# Patient Record
Sex: FEMALE | Race: OTHER | Employment: FULL TIME | URBAN - METROPOLITAN AREA
[De-identification: names, ages, dates, MRNs, and addresses within clinical notes are randomized per-mention and may not be internally consistent; named-entity substitution may affect disease eponyms.]

---

## 2017-07-10 ENCOUNTER — HOSPITAL ENCOUNTER (OUTPATIENT)
Dept: PHYSICAL THERAPY | Age: 37
Discharge: HOME | End: 2017-07-10
Payer: COMMERCIAL

## 2017-07-10 PROCEDURE — 97110 THERAPEUTIC EXERCISES: CPT | Performed by: PHYSICAL THERAPIST

## 2017-07-10 PROCEDURE — 97161 PT EVAL LOW COMPLEX 20 MIN: CPT | Performed by: PHYSICAL THERAPIST

## 2017-07-10 NOTE — PROGRESS NOTES
PT INITIAL EVALUATION NOTE - [facility] 2-15    Patient Name: Debora Crain  Date:7/10/2017  : 1980  [x]  Patient  Verified  Payor: Neel Pop / Plan: [employer] / Product Type: HMO /    In time:900  Out time:1000  Total Treatment Time (min): 60  Total Timed Codes (min): 60 (45 eval, 15 timed see below)  Visit #:1    Treatment Area: Left foot pain [M79.672]    SUBJECTIVE  Any medication changes, allergies to medications, adverse drug reactions, diagnosis change, or new procedure performed?: [] No    [x] Yes (see summary sheet for update)    SUBJECTIVE  Current symptoms/chief complaint: (L) foot pain  Date of onset/injury: 2 mo ago  Ran 3 miles a week ago, had pain after the run  Ran a marathon in April  Took a week off after the marathon  Noticed pain started to build up since marathon  Pain:   4-5/10 max 0/10 min 0/10 now     Aggravated by: sometimes walking around during work  Eased by: rolling foot on spikey ball  Location and description of symptoms: dorsal surface of (L) foot, sharp/shooting initially, then dulls  Any tingling/numbness in LE: none  Shoewear/orthotics: Runs in [employer]. Previous treatment: tried new insoles, chiropractic treatment   Tests/injections: X-ray 2 years ago-neg for stress fracture  Occupation: /  Prior level of function/activity level: Runs 4-5 days per week, 35 miles weekly. Lunges/squats/clams/leg lifts/donkey kicks. Patient goal: Run pain free, inc mileage to 35 miles weekly and intensity  Pt signed up fo the half in November  PMH:  Asthma, Gall bladder removal (), Prior (L) foot pain 2 years ago. Feels like the same injury as she had two years ago.     Was in a boot for 6 weeks  Ran at 12 weeks and was able to return to running    OBJECTIVE      Observation:   Increased pronation (R>L) in standing  (R) posterior rotation illium     Gait: foot flat IC elvia         ROM: AROM and PROM (L) ankle WNL all planes, slight dec PROM DF and PF at end range    MMT: 4+/5 (L) ankle all planes, p! inversion along dorsum of foot    Tenderness to palpation: (L) fibularis, extensor digitorum longus, dorsal interossei    Edema: none     SLS: 30 sec    Squat: quad dominant, dec post weight shift    Single leg squat: inc knee valgus (L) comp to (R)    Flexibility: dec gastroc    Joint Mobility: hypomobility noted (L) rearfoot into ev>inv, dec talocrural jt post glide. Dec 1st ray mobility noted       Outcome Measure: Using standardized self-reported disability survey (Focus on Therapeutic Outcomes) the patient's perceived disability score is 77 - zero is the most disabled and 100 is the least disabled. OBJECTIVE  [x] Skin assessment post-treatment:  [x]intact []redness- no adverse reaction    []redness  adverse reaction:     15 min Therapeutic Exercise:  [x] See flow sheet :   Rationale: increase ROM and increase strength to improve the patients ability to return to running          With   [x] TE   [] TA   [] neuro   [] manual: Patient Education: [x] Review HEP    [] Progressed/Changed HEP based on:   [] positioning   [] body mechanics   [] transfers   [x] Ice application- pt advised to ice 10-15 min 1-2 x/day to area in order to dec inflammation  [x] other:  re: mechanism of injury/condition, role of physical therapy, prognosis for recovery, heat vs ice, activity modifications. Education re: sitting with equal weight between elvia LE's/elvia feet at work station (avoiding with elvia feet in PF)     Pain Level (0-10 scale) post treatment: 0    ASSESSMENT/Changes in Function:     [x]  See Plan of Denise.  Nishant PT, EMILYT, CMTPT  PT License Number: [id number]   7/10/2017  8:54 AM

## 2017-07-10 NOTE — PROGRESS NOTES
[facility]  [address]  [place], [address]  Phone: [phone]  Fax: [phone]    Plan of Care/Statement of Necessity for Physical Therapy Services  2-15    Patient name: Nik Olivares  : 1980  Provider#: [id number]  Referral source: Referred, Self, MD      Medical/Treatment Diagnosis: Left foot pain [M79.672]     Prior Hospitalization: see medical history     Comorbidities: see evaluation  Prior Level of Function:see evaluation  Medications: Verified on Patient Summary List  Start of Care: 7/10/2017     Onset Date:see evaluation   The Plan of Care and following information is based on the information from the initial evaluation.     Assessment/ key information: Patient presents with signs and symptoms consistent with (L) dorsal foot pain and will benefit from physical therapy to address deficits noted below in problem list.     Evaluation Complexity History LOW Complexity : Zero comorbidities / personal factors that will impact the outcome / POC; Examination LOW Complexity : 1-2 Standardized tests and measures addressing body structure, function, activity limitation and / or participation in recreation  ;Presentation LOW Complexity : Stable, uncomplicated  ;Clinical Decision Making MEDIUM Complexity : FOTO score of 26-74  Overall Complexity Rating: LOW     Problem List: pain affecting function, decrease ROM, decrease strength and edema affecting function   Treatment Plan may include any combination of the following: Therapeutic exercise, Therapeutic activities, Neuromuscular re-education, Physical agent/modality, Manual therapy, Patient education and Self Care training  Patient / Family readiness to learn indicated by: asking questions, trying to perform skills and interest  Persons(s) to be included in education: patient (P)  Barriers to Learning/Limitations: None  Patient Goal (s): please see evaluation in [facility]  Patient Self Reported Health Status: please see paper chart  Rehabilitation Potential: excellent    Short Term Goals: To be accomplished in 5 treatments:  -Independent in HEP as evidenced on ability to perform at least 5 exercises from HEP using proper form without verbal cuing.   -Pt will report compliance with icing 1-2x/day in order to decrease inflammation  -Pain upon standing from prolonged sitting eliminated    Long Term Goals: To be accomplished in 10 treatments:  -MMT 5/5 all planes to allow patient to perform ADL's  -Pain 0/10 to allow patient to return to running training  -FOTO score greater than or equal to 74 to allow patient to perform a greater amount of ADLs. Frequency / Duration: Patient to be seen 2 times per week for 8-10 weeks. Patient/ Caregiver education and instruction: self care, activity modification and exercises    [x]  Plan of care has been reviewed with PTA    Certification Period: 7/10/2017 -  10/8/17    Jose Romero. Nishant PT, DPT, CMTPT      2/58/3192 4:08 AM  PT License Number: [id number]  _____________________________________________________________________    I certify that the above Therapy Services are being furnished while the patient is under my care. I agree with the treatment plan and certify that this therapy is necessary.     [de-identified] Signature:____________________  Date:____________Time:_________

## 2017-07-20 ENCOUNTER — HOSPITAL ENCOUNTER (OUTPATIENT)
Dept: PHYSICAL THERAPY | Age: 37
Discharge: HOME | End: 2017-07-20
Payer: COMMERCIAL

## 2017-07-20 PROCEDURE — 97140 MANUAL THERAPY 1/> REGIONS: CPT | Performed by: PHYSICAL THERAPY ASSISTANT

## 2017-07-20 PROCEDURE — 97110 THERAPEUTIC EXERCISES: CPT | Performed by: PHYSICAL THERAPY ASSISTANT

## 2017-07-20 NOTE — PROGRESS NOTES
PT DAILY TREATMENT NOTE 2-15    Patient Name: Mary Maza  Date:2017  : 1980  [x]  Patient  Verified  Payor: [employer] [facility] [employer] / Plan: [employer] / Product Type: HMO /    In time:7:30 AM Out time:8:25 AM  Total Treatment Time (min): 55  Visit #: 2     Treatment Area: Left foot pain [M79.672]    SUBJECTIVE  Pain Level (0-10 scale): 2  Any medication changes, allergies to medications, adverse drug reactions, diagnosis change, or new procedure performed?: [x] No    [] Yes (see summary sheet for update)  Subjective functional status/changes:   [] No changes reported  Patient reports compliance with HEP, states applying ice at home. She has not ran per therapist recommendations. Continues to have pain along dorsum of foot that radiates to anterior shin along extensor digitorum longus. OBJECTIVE    Modality rationale: decrease inflammation and decrease pain to improve the patients ability to occasional walking, working, running   [employer] Type Additional Details    [] Estim: []Att   []Unatt        []TENS instruct                  []IFC  []Premod   []NMES                     []Other:  []w/US   []w/ice   []w/heat  Position:  Location:    []  Traction: [] Cervical       []Lumbar                       [] Prone          []Supine                       []Intermittent   []Continuous Lbs:  [] before manual  [] after manual  []w/heat    []  Ultrasound: []Continuous   [] Pulsed at:                            []1MHz   []3MHz Location:  W/cm2:    []  Paraffin         Location:  []w/heat   Pt declined []  Ice     []  Heat  []  Ice massage Position:  Location:    []  Laser  []  Other: Position:  Location:    []  Vasopneumatic Device Pressure:       [] lo [] med [] hi   Temperature:    [x] Skin assessment post-treatment:  [x]intact []redness- no adverse reaction    []redness  adverse reaction:     40 min Therapeutic Exercise:  [x] See flow sheet : reviewed HEP, added rec.  Elliptical, toe yoga, SLS clocks, short foot with INV (windshiled wiper)   Rationale: increase ROM, increase strength, improve coordination and improve balance to improve the patients ability to occasional walking, working, running    15 min Manual Therapy:  STM to extensor digitorum longus muscle belly, posterior TC joint mobs, 1st ray mobs, 1st MTP ext/flex stretch   Rationale: decrease pain, increase ROM and increase tissue extensibility  to improve the patients ability to occasional walking, working, running     min Gait Training:  ___ feet with ___ device on level surfaces with ___ level of assist   Rationale: increase ROM, increase strength and improve coordination  to improve the patients ability to occasional walking, working, running          With   [x] TE   [] TA   [] neuro   [] other: Patient Education: [x] Review HEP    [x] Progressed/Changed HEP based on: toe yoga, short foot with INV, SLS clocks with short foot  [] positioning   [] body mechanics   [] transfers   [] heat/ice application    [] other:      Other Objective/Functional Measures: nt     Pain Level (0-10 scale) post treatment: 0/10    ASSESSMENT/Changes in Function:   Patient tolerated exercises without aggravation of symptoms. Challenged with Toe yoga exercises. Gave updated HEP. Patient will continue to benefit from skilled PT services to modify and progress therapeutic interventions, address functional mobility deficits, address ROM deficits, address strength deficits, analyze and cue movement patterns and instruct in home and community integration to attain remaining goals.      []  See Plan of Care  []  See progress note/recertification  []  See Discharge Summary         Progress towards goals / Updated goals:  nt    PLAN  []  Upgrade activities as tolerated     [x]  Continue plan of care  []  Update interventions per flow sheet       []  Discharge due to:_  [x]  Other:running analysis, change how shoe laces are tied next visit      Valery Snyder, PTA 7/20/2017  7:30 AM

## 2017-07-25 ENCOUNTER — HOSPITAL ENCOUNTER (OUTPATIENT)
Dept: PHYSICAL THERAPY | Age: 37
Discharge: HOME | End: 2017-07-25
Payer: COMMERCIAL

## 2017-07-25 PROCEDURE — 97110 THERAPEUTIC EXERCISES: CPT | Performed by: PHYSICAL THERAPY ASSISTANT

## 2017-07-25 PROCEDURE — 97140 MANUAL THERAPY 1/> REGIONS: CPT | Performed by: PHYSICAL THERAPY ASSISTANT

## 2017-07-25 NOTE — PROGRESS NOTES
PT DAILY TREATMENT NOTE 2-15    Patient Name: Natali Mis  Date:2017  : 1980  [x]  Patient  Verified  Payor: Landon Mariafredy / Plan: [employer] HMO / Product Type: HMO /    In time:7:30 AM Out time:8:30 AM  Total Treatment Time (min): 60  Visit #: 3     Treatment Area: Left foot pain [M56.428]    SUBJECTIVE  Pain Level (0-10 scale): 0/10  Any medication changes, allergies to medications, adverse drug reactions, diagnosis change, or new procedure performed?: [x] No    [] Yes (see summary sheet for update)  Subjective functional status/changes:   [] No changes reported  Patient states her foot will hurt first thing in the morning when she gets up but as she moves around the pain is less.       OBJECTIVE    Modality rationale: decrease inflammation and decrease pain to improve the patients ability to occasional walking, working, running   [employer] Type Additional Details    [] Estim: []Att   []Unatt        []TENS instruct                  []IFC  []Premod   []NMES                     []Other:  []w/US   []w/ice   []w/heat  Position:  Location:    []  Traction: [] Cervical       []Lumbar                       [] Prone          []Supine                       []Intermittent   []Continuous Lbs:  [] before manual  [] after manual  []w/heat    []  Ultrasound: []Continuous   [] Pulsed at:                            []1MHz   []3MHz Location:  W/cm2:    []  Paraffin         Location:  []w/heat   Pt declined []  Ice     []  Heat  []  Ice massage Position:  Location:    []  Laser  []  Other: Position:  Location:    []  Vasopneumatic Device Pressure:       [] lo [] med [] hi   Temperature:    [x] Skin assessment post-treatment:  [x]intact []redness- no adverse reaction    []redness  adverse reaction:     50 min Therapeutic Exercise:  [x] See flow sheet : added TG unilateral LP at 31 degrees, bosu FW/LAT step ups, lateral stepping with red band, increased reps with SLS clocks    Running Analysis performed today. Rationale: increase ROM, increase strength, improve coordination and improve balance to improve the patients ability to occasional walking, working, running    10 min Manual Therapy:  STM to extensor digitorum longus muscle belly, posterior TC joint mobs, 1st ray mobs, 1st MTP ext/flex stretch   Rationale: decrease pain, increase ROM and increase tissue extensibility  to improve the patients ability to occasional walking, working, running     min Gait Training:   Rationale: increase ROM, increase strength and improve coordination  to improve the patients ability to occasional walking, working, running          With   [x] TE   [] TA   [] neuro   [] other: Patient Education: [x] Review HEP    [x] Progressed/Changed HEP based on:   [] positioning   [] body mechanics   [] transfers   [] heat/ice application    [x] other: sent email to patient regarding alternative ways to lace her shoes to relieve pressure/tenderness along top of her foot. Other Objective/Functional Measures:     MMT:  L  R  Glut Med: 5/5  4+/5  Glut Max: 5-/5  5-/5   Hip Ext: 5/5  5-/5    Pain Level (0-10 scale) post treatment: 0/10    ASSESSMENT/Changes in Function:   Patient without reports of pain during running analysis. Noted mild R glut max and med weakness compared to L side which may be contributing to L foot pain. She tolerated new exercises without aggravation of symptoms    Patient will continue to benefit from skilled PT services to modify and progress therapeutic interventions, address functional mobility deficits, address ROM deficits, address strength deficits, analyze and cue movement patterns and instruct in home and community integration to attain remaining goals.      []  See Plan of Care  []  See progress note/recertification  []  See Discharge Summary         Progress towards goals / Updated goals:  nt    PLAN  []  Upgrade activities as tolerated     [x]  Continue plan of care  []  Update interventions per flow sheet       []  Discharge due to:_  [x]  Other:     Kalyan Braga PTA 7/25/2017  7:30 AM

## 2017-07-27 ENCOUNTER — HOSPITAL ENCOUNTER (OUTPATIENT)
Dept: PHYSICAL THERAPY | Age: 37
Discharge: HOME | End: 2017-07-27
Payer: COMMERCIAL

## 2017-07-27 PROCEDURE — 97110 THERAPEUTIC EXERCISES: CPT | Performed by: PHYSICAL THERAPY ASSISTANT

## 2017-07-27 PROCEDURE — 97116 GAIT TRAINING THERAPY: CPT | Performed by: PHYSICAL THERAPY ASSISTANT

## 2017-07-27 PROCEDURE — 97112 NEUROMUSCULAR REEDUCATION: CPT | Performed by: PHYSICAL THERAPY ASSISTANT

## 2017-07-27 NOTE — PROGRESS NOTES
PT DAILY TREATMENT NOTE 2-15    Patient Name: Bridger Carlson  Date:2017  : 1980  [x]  Patient  Verified  Payor: Carissa Coates / Plan: [employer] HMO / Product Type: HMO /    In time:7:30 AM Out time:8:35 AM  Total Treatment Time (min): 65  Visit #: 4     Treatment Area: Left foot pain [M79.532]    SUBJECTIVE  Pain Level (0-10 scale): 0/10  Any medication changes, allergies to medications, adverse drug reactions, diagnosis change, or new procedure performed?: [x] No    [] Yes (see summary sheet for update)  Subjective functional status/changes:   [] No changes reported  Patient states she may have pain 3-4 miles into her run but most of her pain is after she runs. States she has not ran since  Being advised against it at her initial evaluation. Denies any pain after 12 minute gait analysis after last visit. She watched the video in regards to how her laces should be tied.      OBJECTIVE    Modality rationale: decrease inflammation and decrease pain to improve the patients ability to occasional walking, working, running   [employer] Type Additional Details    [] Estim: []Att   []Unatt        []TENS instruct                  []IFC  []Premod   []NMES                     []Other:  []w/US   []w/ice   []w/heat  Position:  Location:    []  Traction: [] Cervical       []Lumbar                       [] Prone          []Supine                       []Intermittent   []Continuous Lbs:  [] before manual  [] after manual  []w/heat    []  Ultrasound: []Continuous   [] Pulsed at:                            []1MHz   []3MHz Location:  W/cm2:    []  Paraffin         Location:  []w/heat   Pt declined []  Ice     []  Heat  []  Ice massage Position:  Location:    []  Laser  []  Other: Position:  Location:    []  Vasopneumatic Device Pressure:       [] lo [] med [] hi   Temperature:    [x] Skin assessment post-treatment:  [x]intact []redness- no adverse reaction    []redness  adverse reaction:     40 min Therapeutic Exercise:  [x] See flow sheet : added   Added bridge with marchuche   Rationale: increase ROM, increase strength, improve coordination and improve balance to improve the patients ability to occasional walking, working, running    omit min Manual Therapy:  STM to extensor digitorum longus muscle belly, posterior TC joint mobs, 1st ray mobs, 1st MTP ext/flex stretch   Rationale: decrease pain, increase ROM and increase tissue extensibility  to improve the patients ability to occasional walking, working, running    15 min Gait Training:  Reviewed running analysis: encouraged patient to try to shorten her stride to reduce knee extension at IC. Also discussed trendelenburg sign in R Stance as a contributing factor in L foot pain. Rationale: increase ROM, increase strength and improve coordination  to improve the patients ability to occasional walking, working, running    10 min Neuromuscular Re-Education:  Added \"Running Man\" and Hop and Hold focusing on neutral pelvic alignment in stance phase    Rationale: increase ROM, increase strength and improve coordination  to improve the patients ability to occasional walking, working, running          With   [x] TE   [] TA   [] neuro   [] other: Patient Education: [x] Review HEP    [x] Progressed/Changed HEP based on: bridge with march, lateral stepping with red band, running man, hop and hold  [] positioning   [] body mechanics   [] transfers   [] heat/ice application    [x] other:      Other Objective/Functional Measures:     Pain Level (0-10 scale) post treatment: 0/10    ASSESSMENT/Changes in Function:    Patient challenged with addition of glut strengthening and neutral pelvic alignment in WB but no reports of foot pain, gave updated HEP with patient to trial with run/walk.    Patient will continue to benefit from skilled PT services to modify and progress therapeutic interventions, address functional mobility deficits, address ROM deficits, address strength deficits, analyze and cue movement patterns and instruct in home and community integration to attain remaining goals.      []  See Plan of Care  []  See progress note/recertification  []  See Discharge Summary         Progress towards goals / Updated goals:  nt    PLAN  []  Upgrade activities as tolerated     [x]  Continue plan of care  []  Update interventions per flow sheet       []  Discharge due to:_  [x]  Other: gait analysis next visit, send note to MD David Jackson, PTA 7/27/2017  7:30 AM

## 2017-07-31 ENCOUNTER — HOSPITAL ENCOUNTER (OUTPATIENT)
Dept: PHYSICAL THERAPY | Age: 37
Discharge: HOME | End: 2017-07-31
Payer: COMMERCIAL

## 2017-07-31 PROCEDURE — 97110 THERAPEUTIC EXERCISES: CPT | Performed by: PHYSICAL THERAPY ASSISTANT

## 2017-07-31 PROCEDURE — 97112 NEUROMUSCULAR REEDUCATION: CPT | Performed by: PHYSICAL THERAPY ASSISTANT

## 2017-07-31 NOTE — PROGRESS NOTES
PT DAILY TREATMENT NOTE 2-15    Patient Name: Lisa Draft  Date:2017  : 1980  [x]  Patient  Verified  Payor: Michelle Jhaveri / Plan: [employer] / Product Type: HMO /    In time:4:00 PM Out time:5:00 PM  Total Treatment Time (min): 60  Visit #: 5     Treatment Area: Left foot pain [M79.032]    SUBJECTIVE  Pain Level (0-10 scale): 0/10  Any medication changes, allergies to medications, adverse drug reactions, diagnosis change, or new procedure performed?: [x] No    [] Yes (see summary sheet for update)  Subjective functional status/changes:   [] No changes reported  Patient states she did a run walk on Thursday for 21 minutes and 30 minutes on Saturday, she has been practicing running at 172bpm to shorten her stride. Denies any pain after her run/walk sessions over the weekend. States she hasn't woken up with foot pain since last visit.     OBJECTIVE    Modality rationale: decrease inflammation and decrease pain to improve the patients ability to occasional walking, working, running   [employer] Type Additional Details    [] Estim: []Att   []Unatt        []TENS instruct                  []IFC  []Premod   []NMES                     []Other:  []w/US   []w/ice   []w/heat  Position:  Location:    []  Traction: [] Cervical       []Lumbar                       [] Prone          []Supine                       []Intermittent   []Continuous Lbs:  [] before manual  [] after manual  []w/heat    []  Ultrasound: []Continuous   [] Pulsed at:                            []1MHz   []3MHz Location:  W/cm2:    []  Paraffin         Location:  []w/heat   Pt declined []  Ice     []  Heat  []  Ice massage Position:  Location:    []  Laser  []  Other: Position:  Location:    []  Vasopneumatic Device Pressure:       [] lo [] med [] hi   Temperature:    [x] Skin assessment post-treatment:  [x]intact []redness- no adverse reaction    []redness  adverse reaction:     45 min Therapeutic Exercise:  [x] See flow sheet : added steamboats, monster walks     Rationale: increase ROM, increase strength, improve coordination and improve balance to improve the patients ability to occasional walking, working, running    omit min Manual Therapy:  STM to extensor digitorum longus muscle belly, posterior TC joint mobs, 1st ray mobs, 1st MTP ext/flex stretch   Rationale: decrease pain, increase ROM and increase tissue extensibility  to improve the patients ability to occasional walking, working, running    omit min Gait Training:  Reviewed running analysis: encouraged patient to try to shorten her stride to reduce knee extension at IC. Also discussed trendelenburg sign in R Stance as a contributing factor in L foot pain. Rationale: increase ROM, increase strength and improve coordination  to improve the patients ability to occasional walking, working, running    15 min Neuromuscular Re-Education:  Added \"Running Man\" and Hop and Hold focusing on neutral pelvic alignment in stance phase, steamboats with red band in SLS    Rationale: increase ROM, increase strength and improve coordination  to improve the patients ability to occasional walking, working, running          With   [x] TE   [] TA   [] neuro   [] other: Patient Education: [x] Review HEP    [x] Progressed/Changed HEP based on: bridge with march, lateral stepping with red band, running man, hop and hold  [] positioning   [] body mechanics   [] transfers   [] heat/ice application    [x] other:      Other Objective/Functional Measures:     Pain Level (0-10 scale) post treatment: 0/10    ASSESSMENT/Changes in Function:     Challenged with SLS steamboats, progressing well with therapeutic interventions.      Patient will continue to benefit from skilled PT services to modify and progress therapeutic interventions, address functional mobility deficits, address ROM deficits, address strength deficits, analyze and cue movement patterns and instruct in home and community integration to attain remaining goals.      []  See Plan of Care  []  See progress note/recertification  []  See Discharge Summary         Progress towards goals / Updated goals:  nt    PLAN  []  Upgrade activities as tolerated     [x]  Continue plan of care  []  Update interventions per flow sheet       []  Discharge due to:_  [x]  Other: review gait analysis     Kalyan Braga, PTA 7/31/2017  4:00 PM

## 2017-08-03 ENCOUNTER — HOSPITAL ENCOUNTER (OUTPATIENT)
Dept: PHYSICAL THERAPY | Age: 37
Discharge: HOME | End: 2017-08-03
Payer: COMMERCIAL

## 2017-08-03 PROCEDURE — 97110 THERAPEUTIC EXERCISES: CPT | Performed by: PHYSICAL THERAPIST

## 2017-08-03 PROCEDURE — 97112 NEUROMUSCULAR REEDUCATION: CPT | Performed by: PHYSICAL THERAPIST

## 2017-08-03 NOTE — PROGRESS NOTES
PT DAILY TREATMENT NOTE 2-15    Patient Name: Nori Farrar  Date:8/3/2017  : 1980  [x]  Patient  Verified  Payor: Nasrin Hong / Plan: [employer] HMO / Product Type: HMO /    In time:500 P Out time:550  Total Treatment Time (min):50  Visit #: 6    Treatment Area: Left foot pain [M79.382]    SUBJECTIVE  Pain Level (0-10 scale): 0/10  Any medication changes, allergies to medications, adverse drug reactions, diagnosis change, or new procedure performed?: [x] No    [] Yes (see summary sheet for update)  Subjective functional status/changes:   [] No changes reported  \"no pain at all with running, it's been feeling really good. Exercises are helping.  \"    OBJECTIVE    [x] Skin assessment post-treatment:  [x]intact []redness- no adverse reaction    []redness  adverse reaction:     40 min Therapeutic Exercise:  [x] See flow sheet :      Rationale: increase ROM, increase strength, improve coordination and improve balance to improve the patients ability to occasional walking, working, running    omit min Manual Therapy:  STM to extensor digitorum longus muscle belly, posterior TC joint mobs, 1st ray mobs, 1st MTP ext/flex stretch   Rationale: decrease pain, increase ROM and increase tissue extensibility  to improve the patients ability to occasional walking, working, running    omit min Gait Training:     Rationale: increase ROM, increase strength and improve coordination  to improve the patients ability to occasional walking, working, running    10 min Neuromuscular Re-Education:   Rationale: increase ROM, increase strength and improve coordination  to improve the patients ability to occasional walking, working, running          With   [x] TE   [] TA   [] neuro   [] other: Patient Education: [x] Review HEP    [x] Progressed/Changed HEP based on: bridge with march, lateral stepping with red band, running man, hop and hold  [] positioning   [] body mechanics   [] transfers   [] heat/ice application    [x] other: discussed transition to HEP. If questions, pt advised to email/call. Pt advised if she should need to return for further PT, pt would need to obtain a RX from PCP     Other Objective/Functional Measures:     Using standardized self-reported disability survey (Focus on Therapeutic Outcomes) the patient's perceived disability score is 83 - zero is the most disabled and 100 is the least disabled. This is a 17 point improvement since initial assessment performed on 7/10/17. Pain Level (0-10 scale) post treatment: 0/10    ASSESSMENT/Changes in Function:        Progress towards goals / Updated goals:     Short Term Goals: To be accomplished in 5 treatments:  -Independent in HEP as evidenced on ability to perform at least 5 exercises from HEP using proper form without verbal cuing. -MET  -Pt will report compliance with icing 1-2x/day in order to decrease inflammation-MET  -Pain upon standing from prolonged sitting eliminated-MET     Long Term Goals: To be accomplished in 10 treatments:  -MMT 5/5 all planes to allow patient to perform ADL's-MET  -Pain 0/10 to allow patient to return to running training-MET  -FOTO score greater than or equal to 74 to allow patient to perform a greater amount of ADLs. -MET  Pt current has met all goals outlined in the initial evaluation. Pt ready to attempt transition to HEP at this time. PLAN  []  Upgrade activities as tolerated     [x]  Continue plan of care  []  Update interventions per flow sheet       []  Discharge due to:_  []  Other: Pt to transition to HEP. Plan to leave chart open until 9/3/17 in case pt's symptoms return. Mecca Dillard, PT 8/3/2017  4:00 PM

## 2017-11-16 ENCOUNTER — OFFICE VISIT (OUTPATIENT)
Dept: INTERNAL MEDICINE CLINIC | Age: 37
End: 2017-11-16

## 2017-11-16 VITALS
OXYGEN SATURATION: 100 % | DIASTOLIC BLOOD PRESSURE: 72 MMHG | HEART RATE: 64 BPM | SYSTOLIC BLOOD PRESSURE: 126 MMHG | TEMPERATURE: 98.6 F

## 2017-11-16 DIAGNOSIS — M77.8 HAND TENDONITIS: ICD-10-CM

## 2017-11-16 DIAGNOSIS — M71.332 SYNOVIAL CYST OF WRIST, LEFT: ICD-10-CM

## 2017-11-16 DIAGNOSIS — Z00.00 WELL WOMAN EXAM (NO GYNECOLOGICAL EXAM): Primary | ICD-10-CM

## 2017-11-16 DIAGNOSIS — J30.9 ALLERGIC RHINITIS, UNSPECIFIED CHRONICITY, UNSPECIFIED SEASONALITY, UNSPECIFIED TRIGGER: ICD-10-CM

## 2017-11-16 RX ORDER — BISMUTH SUBSALICYLATE 262 MG
1 TABLET,CHEWABLE ORAL DAILY
COMMUNITY

## 2017-11-16 RX ORDER — AZELASTINE 1 MG/ML
SPRAY, METERED NASAL
Refills: 3 | COMMUNITY
Start: 2017-11-01 | End: 2020-10-13

## 2017-11-16 NOTE — MR AVS SNAPSHOT
Visit Information Date & Time Provider Department Dept. Phone Encounter #  
 11/16/2017 10:00 AM Trip Bass, [address] [place] [address], [facility] [phone] Follow-up Instructions Return in about 1 year (around 11/16/2018), or if symptoms worsen or fail to improve, for wellness visit. Upcoming Health Maintenance Date Due  
 PAP AKA CERVICAL CYTOLOGY 6/1/2018 DTaP/Tdap/Td series (2 - Td) 8/12/2024 Allergies as of 11/16/2017  Review Complete On: 11/16/2017 By: Trip Bass MD  
 No Known Allergies Current Immunizations  Reviewed on 11/16/2017 Name Date Influenza Vaccine 9/20/2017, 9/18/2015 TD Vaccine 9/21/2009 Tdap 8/12/2014 Reviewed by Trip Bass MD on 11/16/2017 at 11:30 AM  
You Were Diagnosed With   
  
 Codes Comments Well woman exam (no gynecological exam)    -  Primary ICD-10-CM: Z00.00 ICD-9-CM: V70.0 [V70.0] Hand tendonitis     ICD-10-CM: M77.8 ICD-9-CM: 727.05 Allergic rhinitis, unspecified chronicity, unspecified seasonality, unspecified trigger     ICD-10-CM: J30.9 ICD-9-CM: 477.9 Synovial cyst of wrist, left     ICD-10-CM: U65.902 ICD-9-CM: 727.41 Vitals BP Pulse Temp SpO2 OB Status Smoking Status 126/72 (BP 1 Location: Left arm, BP Patient Position: Sitting) 64 98.6 °F (37 °C) (Oral) 100% Having regular periods Never Smoker Vitals History Preferred Pharmacy Pharmacy Name Phone [facility] [address] [phone] Your Updated Medication List  
  
   
This list is accurate as of: 11/16/17 11:38 AM.  Always use your most recent med list.  
  
  
  
  
 azelastine 137 mcg (0.1 %) nasal spray Commonly known as:  ASTELIN  
SPRAY 1-2 SQUIRTS INTO EACH NOSTRIL EVERY 12 HOURS  
  
 cyclobenzaprine 10 mg tablet Commonly known as:  FLEXERIL Take 0.5 Tabs by mouth three (3) times daily as needed for Muscle Spasm(s). DYMISTA 137-50 mcg/spray [place] Generic drug:  azelastine-fluticasone  
by Nasal route. methylPREDNISolone 4 mg tablet Commonly known as:  MEDROL (BOBBY) Per dose pack instructions  
  
 multivitamin tablet Commonly known as:  ONE A DAY Take 1 Tab by mouth daily. PATADAY OP Apply  to eye. PROVENTIL HFA 90 mcg/actuation inhaler Generic drug:  albuterol Take 1 Puff by inhalation. ZyrTEC 10 mg tablet Generic drug:  cetirizine Take  by mouth as needed. Mostly in the Spring We Performed the Following MANAV W/REFLEX [86824 CPT(R)] C REACTIVE PROTEIN, QT [34683 CPT(R)] CBC WITH AUTOMATED DIFF [05202 CPT(R)] LIPID PANEL [92765 CPT(R)] METABOLIC PANEL, COMPREHENSIVE [70624 CPT(R)]   
 RA + CCP ABS [BTC05871 Custom] REFERRAL TO PHYSICAL THERAPY [UPA27 Custom] Comments:  
 Hand tendonitis SED RATE (ESR) [id number] CPT(R)] VITAMIN D, 25 HYDROXY [id number] CPT(R)] Follow-up Instructions Return in about 1 year (around 11/16/2018), or if symptoms worsen or fail to improve, for wellness visit. Referral Information Referral ID Referred By Referred To  
  
 [id number] Kalli Orlando Not Available Visits Status Start Date End Date 1 New Request 11/16/17 11/16/18 If your referral has a status of pending review or denied, additional information will be sent to support the outcome of this decision. Introducing [facility]! Dear Chloe Madison: Thank you for requesting a [employer] account. Our records indicate that you already have an active [employer] account. You can access your account anytime at [email or web address] Did you know that you can access your hospital and ER discharge instructions at any time in [employer]? You can also review all of your test results from your hospital stay or ER visit. Additional Information If you have questions, please visit the Frequently Asked Questions section of the [employer] website at [email or web address] Remember, [employer] is NOT to be used for urgent needs. For medical emergencies, dial 911. Now available from your iPhone and Android! Please provide this summary of care documentation to your next provider. Your primary care clinician is listed as [address] If you have any questions after today's visit, please call [phone].

## 2017-11-16 NOTE — PROGRESS NOTES
Rm 14    Chief Complaint   Patient presents with    Complete Physical    Wrist Pain     bilateral, very achy per pt, hard to  things     1. Have you been to the ER, urgent care clinic since your last visit? Hospitalized since your last visit? No    2. Have you seen or consulted any other health care providers outside of the [address] [place] [address] since your last visit? Include any pap smears or colon screening.  No    Health Maintenance Due   Topic Date Due    Pneumococcal 19-64 Medium Risk (1 of 1 - PPSV23) 06/07/2001    PAP AKA CERVICAL CYTOLOGY  10/01/2014   flu vaccine done with employer 09/20/17  Pap smear done 6/2017 Dr Duff Party    PHQ over the last two weeks 11/16/2017   Little interest or pleasure in doing things Not at all   Feeling down, depressed or hopeless Not at all   Total Score PHQ 2 0

## 2017-11-16 NOTE — PROGRESS NOTES
Subjective:   30 y.o. female for Well Woman Check. Her gyne and breast care is done elsewhere by her Ob-Gyne physician. Past medical, Social, and Family history reviewed  Medications reviewed and updated. ROS: Feeling generally well. No TIA's or unusual headaches, no dysphagia. No prolonged cough. No dyspnea or chest pain on exertion. No abdominal pain, change in bowel habits, black or bloody stools. No urinary tract symptoms. No new or unusual musculoskeletal symptoms. Specific concerns today:   L>R wrist and hand pain    Seeing endocrine - 10/2017 prolactin and thyroid NL. Seasonal allergies and asthma - exercise induced      Objective: The patient appears well, alert, oriented x 3, in no distress. Visit Vitals    /72 (BP 1 Location: Left arm, BP Patient Position: Sitting)    Pulse 64    Temp 98.6 °F (37 °C) (Oral)    SpO2 100%     ENT normal.  Neck supple. No adenopathy or thyromegaly. KAMINI. Lungs are clear, good air entry, no wheezes, rhonchi or rales. S1 and S2 normal, no murmurs, regular rate and rhythm. Abdomen soft without tenderness, guarding, mass or organomegaly. Extremities show no edema, normal peripheral pulses. Neurological is normal, no focal findings. Breast and Pelvic exams are deferred. Prior labs reviewed. Assessment/Plan:   Well Woman  follow low fat diet, routine labs ordered, call if any problems    ICD-10-CM ICD-9-CM    1. Well woman exam (no gynecological exam) Z00.00 V70.0 CBC WITH AUTOMATED DIFF      LIPID PANEL      METABOLIC PANEL, COMPREHENSIVE      VITAMIN D, 25 HYDROXY    [V70.0]   2. Hand tendonitis M77.8 727.05 REFERRAL TO PHYSICAL THERAPY      SED RATE (ESR)      C REACTIVE PROTEIN, QT      MANAV W/REFLEX      RA + CCP ABS   3. Allergic rhinitis, unspecified chronicity, unspecified seasonality, unspecified trigger J30.9 477.9    4.  Synovial cyst of wrist, left M67.432 727.41      Follow-up Disposition:  Return in about 1 year (around 11/16/2018), or if symptoms worsen or fail to improve, for wellness visit.   results and schedule of future studies reviewed with patient  reviewed diet, exercise and weight   cardiovascular risk and specific lipid/LDL goals reviewed  reviewed medications and side effects in detail   Ref to Hand PT  Consider ortho hand referral - deferred today  Follow with endocrine

## 2017-11-18 LAB
25(OH)D3+25(OH)D2 SERPL-MCNC: 44.7 NG/ML (ref 30–100)
ALBUMIN SERPL-MCNC: 4.6 G/DL (ref 3.5–5.5)
ALBUMIN/GLOB SERPL: 1.9 {RATIO} (ref 1.2–2.2)
ALP SERPL-CCNC: 53 IU/L (ref 39–117)
ALT SERPL-CCNC: 11 IU/L (ref 0–32)
ANA SER QL: NEGATIVE
AST SERPL-CCNC: 18 IU/L (ref 0–40)
BASOPHILS # BLD AUTO: 0 X10E3/UL (ref 0–0.2)
BASOPHILS NFR BLD AUTO: 0 %
BILIRUB SERPL-MCNC: 0.7 MG/DL (ref 0–1.2)
BUN SERPL-MCNC: 12 MG/DL (ref 6–20)
BUN/CREAT SERPL: 19 (ref 9–23)
CALCIUM SERPL-MCNC: 9.5 MG/DL (ref 8.7–10.2)
CCP IGA+IGG SERPL IA-ACNC: 5 UNITS (ref 0–19)
CHLORIDE SERPL-SCNC: 99 MMOL/L (ref 96–106)
CHOLEST SERPL-MCNC: 147 MG/DL (ref 100–199)
CO2 SERPL-SCNC: 26 MMOL/L (ref 18–29)
CREAT SERPL-MCNC: 0.63 MG/DL (ref 0.57–1)
CRP SERPL-MCNC: 1.3 MG/L (ref 0–4.9)
EOSINOPHIL # BLD AUTO: 0 X10E3/UL (ref 0–0.4)
EOSINOPHIL NFR BLD AUTO: 0 %
ERYTHROCYTE [DISTWIDTH] IN BLOOD BY AUTOMATED COUNT: 13.5 % (ref 12.3–15.4)
ERYTHROCYTE [SEDIMENTATION RATE] IN BLOOD BY WESTERGREN METHOD: 6 MM/HR (ref 0–32)
GLOBULIN SER CALC-MCNC: 2.4 G/DL (ref 1.5–4.5)
GLUCOSE SERPL-MCNC: 85 MG/DL (ref 65–99)
HCT VFR BLD AUTO: 37 % (ref 34–46.6)
HDLC SERPL-MCNC: 48 MG/DL
HGB BLD-MCNC: 12.5 G/DL (ref 11.1–15.9)
IMM GRANULOCYTES # BLD: 0 X10E3/UL (ref 0–0.1)
IMM GRANULOCYTES NFR BLD: 0 %
LDLC SERPL CALC-MCNC: 89 MG/DL (ref 0–99)
LYMPHOCYTES # BLD AUTO: 1.6 X10E3/UL (ref 0.7–3.1)
LYMPHOCYTES NFR BLD AUTO: 20 %
MCH RBC QN AUTO: 29.3 PG (ref 26.6–33)
MCHC RBC AUTO-ENTMCNC: 33.8 G/DL (ref 31.5–35.7)
MCV RBC AUTO: 87 FL (ref 79–97)
MONOCYTES # BLD AUTO: 0.5 X10E3/UL (ref 0.1–0.9)
MONOCYTES NFR BLD AUTO: 6 %
NEUTROPHILS # BLD AUTO: 5.7 X10E3/UL (ref 1.4–7)
NEUTROPHILS NFR BLD AUTO: 74 %
PLATELET # BLD AUTO: 262 X10E3/UL (ref 150–379)
POTASSIUM SERPL-SCNC: 4.1 MMOL/L (ref 3.5–5.2)
PROT SERPL-MCNC: 7 G/DL (ref 6–8.5)
RBC # BLD AUTO: 4.26 X10E6/UL (ref 3.77–5.28)
RHEUMATOID FACT SERPL-ACNC: <10 IU/ML (ref 0–13.9)
SODIUM SERPL-SCNC: 138 MMOL/L (ref 134–144)
TRIGL SERPL-MCNC: 49 MG/DL (ref 0–149)
VLDLC SERPL CALC-MCNC: 10 MG/DL (ref 5–40)
WBC # BLD AUTO: 7.9 X10E3/UL (ref 3.4–10.8)

## 2018-12-04 ENCOUNTER — OFFICE VISIT (OUTPATIENT)
Dept: INTERNAL MEDICINE CLINIC | Age: 38
End: 2018-12-04

## 2018-12-04 VITALS
TEMPERATURE: 98.6 F | WEIGHT: 160 LBS | RESPIRATION RATE: 18 BRPM | BODY MASS INDEX: 30.21 KG/M2 | DIASTOLIC BLOOD PRESSURE: 74 MMHG | SYSTOLIC BLOOD PRESSURE: 122 MMHG | HEIGHT: 61 IN | HEART RATE: 70 BPM | OXYGEN SATURATION: 100 %

## 2018-12-04 DIAGNOSIS — R10.11 COLICKY RUQ ABDOMINAL PAIN: Primary | ICD-10-CM

## 2018-12-04 DIAGNOSIS — Z90.49 HISTORY OF CHOLECYSTECTOMY: ICD-10-CM

## 2018-12-04 RX ORDER — NORETHINDRONE ACETATE AND ETHINYL ESTRADIOL AND FERROUS FUMARATE 1MG-20(21)
KIT ORAL
Refills: 1 | COMMUNITY
Start: 2018-11-19 | End: 2019-07-31

## 2018-12-04 RX ORDER — RANITIDINE HCL 75 MG
75 TABLET ORAL
COMMUNITY
End: 2019-07-31

## 2018-12-04 NOTE — PATIENT INSTRUCTIONS
For pain. Try taking 400 mg advil 2-3 times per day. We will plan to contact you with lab results once available tomorrow or Thursday. Abdominal Pain: Care Instructions  Your Care Instructions    Abdominal pain has many possible causes. Some aren't serious and get better on their own in a few days. Others need more testing and treatment. If your pain continues or gets worse, you need to be rechecked and may need more tests to find out what is wrong. You may need surgery to correct the problem. Don't ignore new symptoms, such as fever, nausea and vomiting, urination problems, pain that gets worse, and dizziness. These may be signs of a more serious problem. Your doctor may have recommended a follow-up visit in the next 8 to 12 hours. If you are not getting better, you may need more tests or treatment. The doctor has checked you carefully, but problems can develop later. If you notice any problems or new symptoms, get medical treatment right away. Follow-up care is a key part of your treatment and safety. Be sure to make and go to all appointments, and call your doctor if you are having problems. It's also a good idea to know your test results and keep a list of the medicines you take. How can you care for yourself at home? · Rest until you feel better. · To prevent dehydration, drink plenty of fluids, enough so that your urine is light yellow or clear like water. Choose water and other caffeine-free clear liquids until you feel better. If you have kidney, heart, or liver disease and have to limit fluids, talk with your doctor before you increase the amount of fluids you drink. · If your stomach is upset, eat mild foods, such as rice, dry toast or crackers, bananas, and applesauce. Try eating several small meals instead of two or three large ones. · Wait until 48 hours after all symptoms have gone away before you have spicy foods, alcohol, and drinks that contain caffeine.   · Do not eat foods that are high in fat. · Avoid anti-inflammatory medicines such as aspirin, ibuprofen (Advil, Motrin), and naproxen (Aleve). These can cause stomach upset. Talk to your doctor if you take daily aspirin for another health problem. When should you call for help? Call 911 anytime you think you may need emergency care. For example, call if:    · You passed out (lost consciousness).     · You pass maroon or very bloody stools.     · You vomit blood or what looks like coffee grounds.     · You have new, severe belly pain.    Call your doctor now or seek immediate medical care if:    · Your pain gets worse, especially if it becomes focused in one area of your belly.     · You have a new or higher fever.     · Your stools are black and look like tar, or they have streaks of blood.     · You have unexpected vaginal bleeding.     · You have symptoms of a urinary tract infection. These may include:  ? Pain when you urinate. ? Urinating more often than usual.  ? Blood in your urine.     · You are dizzy or lightheaded, or you feel like you may faint.    Watch closely for changes in your health, and be sure to contact your doctor if:    · You are not getting better after 1 day (24 hours). Where can you learn more? Go to http://masoud-luma.info/. Enter L623 in the search box to learn more about \"Abdominal Pain: Care Instructions. \"  Current as of: November 20, 2017  Content Version: 11.8  © 5846-6500 [employer]. Care instructions adapted under license by [employer] (which disclaims liability or warranty for this information). If you have questions about a medical condition or this instruction, always ask your healthcare professional. [place] [address] any warranty or liability for your use of this information.

## 2018-12-04 NOTE — PROGRESS NOTES
Exam room 2    Toshia Colón is a 41 y.o. female    Chief Complaint   Patient presents with    Abdominal Pain     upper right quandrant x 4 days     1. Have you been to the ER, urgent care clinic since your last visit? Hospitalized since your last visit? No    2. Have you seen or consulted any other health care providers outside of the [address] [place] [address] since your last visit? Include any pap smears or colon screening. No     Health Maintenance Due   Topic Date Due    PAP AKA CERVICAL CYTOLOGY  06/01/2018    Influenza Age 5 to Adult  08/01/2018     Influenza vaccine received 9/2018 through employer.

## 2018-12-04 NOTE — PROGRESS NOTES
ACUTE VISIT     HPI:   Jillian Ceballos is a 41 y.o. female, she presents today for:     41year old woman with history of cholecystectomy, presenting with RUQ pain. - started with  More severe on Sunday, always present but has some surges in pain. Seems to worsen with certain movements. No changes in bowel movements no urination. - no constipation, no extra flatulence. - no measured fevers. - no nausea, but lack of appetite with pain was high. Has had some intermittently since gallbladder removed. 12 years ago. Has never been evaluated for this pain previously. Notes that she tried taking pain medication and it did not help. (tylenol 3)    ROS: as noted above    Medications used for acute illness: tylenol 3    Current Outpatient Medications on File Prior to Visit   Medication Sig    JUNEL FE 1/20, [birth year], 1 mg-20 mcg (21)/75 mg (7) tab TAKE 1 TABLET BY MOUTH EVERY DAY    raNITIdine (ZANTAC) 75 mg tab Take 75 mg by mouth.  multivitamin (ONE A DAY) tablet Take 1 Tab by mouth daily.  OLOPATADINE HCL (PATADAY OP) Apply  to eye.  cetirizine (ZYRTEC) 10 mg tablet Take  by mouth as needed. Mostly in the Spring     albuterol (PROVENTIL HFA) 90 mcg/Actuation inhaler Take 1 Puff by inhalation.  azelastine (ASTELIN) 137 mcg (0.1 %) nasal spray SPRAY 1-2 SQUIRTS INTO EACH NOSTRIL EVERY 12 HOURS    cyclobenzaprine (FLEXERIL) 10 mg tablet Take 0.5 Tabs by mouth three (3) times daily as needed for Muscle Spasm(s).  azelastine-fluticasone (DYMISTA) 137-50 mcg/spray spry by Nasal route. No current facility-administered medications on file prior to visit. No Known Allergies    PMH/PSH/FH: reviewed and updated    Sochx:   reports that  has never smoked. she has never used smokeless tobacco. She reports that she does not drink alcohol or use drugs. PE:  Blood pressure 122/74, pulse 70, temperature 98.6 °F (37 °C), temperature source Oral, resp.  rate 18, height 5' 1\" (1.549 m), weight 160 lb (72.6 kg), last menstrual period 11/26/2018, SpO2 100 %. Body mass index is 30.23 kg/m². Physical Exam   Constitutional: She is oriented to person, place, and time. She appears well-developed and well-nourished. No distress. HENT:   Head: Normocephalic. Mouth/Throat: Oropharynx is clear and moist.   Eyes: Conjunctivae and EOM are normal.   Neck: Neck supple. Cardiovascular: Normal rate, regular rhythm and normal heart sounds. Pulmonary/Chest: Effort normal and breath sounds normal.   Abdominal: Soft. She exhibits no mass. There is no rebound. Tender in lateral aspect of right upper abdomen. Lymphadenopathy:     She has no cervical adenopathy. Neurological: She is alert and oriented to person, place, and time. Skin: Skin is warm and dry. Capillary refill takes less than 2 seconds. Nursing note and vitals reviewed. Labs:  No results found for any visits on 12/04/18. Panda Ernandez was seen today for had concerns including Abdominal Pain (upper right quandrant x 4 days). .  The diagnosis and plan was discussed including:        ICD-10-CM ICD-9-CM    1. Colicky RUQ abdominal pain R10.11 789.01 US ABD LTD      CBC WITH AUTOMATED DIFF      METABOLIC PANEL, COMPREHENSIVE      LIPASE      REFERRAL TO GASTROENTEROLOGY   2. History of cholecystectomy Z90.49 V45.79 US ABD LTD      CBC WITH AUTOMATED DIFF      METABOLIC PANEL, COMPREHENSIVE      LIPASE      REFERRAL TO GASTROENTEROLOGY     Colicky RUQ pain: intermittent for 12 years since gallbladder removed, but 4 days ago more extreme and only somewhat relieved. With normal appetite, no fever, no jaundice, normal bowel movements. Suspect possible liver vs biliary tree irritation.    - labs, ultrasound and referred to GI.     - I advised her to call back or return to office if symptoms worsen/change/persist.  - She was given AVS and expressed understanding with the diagnosis and plan as discussed.     Follow-up Disposition:  Return if symptoms worsen or fail to improve.

## 2018-12-05 LAB
ALBUMIN SERPL-MCNC: 4.3 G/DL (ref 3.5–5.5)
ALBUMIN/GLOB SERPL: 1.5 {RATIO} (ref 1.2–2.2)
ALP SERPL-CCNC: 55 IU/L (ref 39–117)
ALT SERPL-CCNC: 12 IU/L (ref 0–32)
AST SERPL-CCNC: 17 IU/L (ref 0–40)
BASOPHILS # BLD AUTO: 0 X10E3/UL (ref 0–0.2)
BASOPHILS NFR BLD AUTO: 0 %
BILIRUB SERPL-MCNC: <0.2 MG/DL (ref 0–1.2)
BUN SERPL-MCNC: 13 MG/DL (ref 6–20)
BUN/CREAT SERPL: 17 (ref 9–23)
CALCIUM SERPL-MCNC: 9.5 MG/DL (ref 8.7–10.2)
CHLORIDE SERPL-SCNC: 99 MMOL/L (ref 96–106)
CO2 SERPL-SCNC: 25 MMOL/L (ref 20–29)
CREAT SERPL-MCNC: 0.78 MG/DL (ref 0.57–1)
EOSINOPHIL # BLD AUTO: 0 X10E3/UL (ref 0–0.4)
EOSINOPHIL NFR BLD AUTO: 0 %
ERYTHROCYTE [DISTWIDTH] IN BLOOD BY AUTOMATED COUNT: 14.5 % (ref 12.3–15.4)
GLOBULIN SER CALC-MCNC: 2.8 G/DL (ref 1.5–4.5)
GLUCOSE SERPL-MCNC: 94 MG/DL (ref 65–99)
HCT VFR BLD AUTO: 38.2 % (ref 34–46.6)
HGB BLD-MCNC: 12.5 G/DL (ref 11.1–15.9)
IMM GRANULOCYTES # BLD: 0 X10E3/UL (ref 0–0.1)
IMM GRANULOCYTES NFR BLD: 0 %
LIPASE SERPL-CCNC: 39 U/L (ref 14–72)
LYMPHOCYTES # BLD AUTO: 2.2 X10E3/UL (ref 0.7–3.1)
LYMPHOCYTES NFR BLD AUTO: 21 %
MCH RBC QN AUTO: 27.8 PG (ref 26.6–33)
MCHC RBC AUTO-ENTMCNC: 32.7 G/DL (ref 31.5–35.7)
MCV RBC AUTO: 85 FL (ref 79–97)
MONOCYTES # BLD AUTO: 0.6 X10E3/UL (ref 0.1–0.9)
MONOCYTES NFR BLD AUTO: 5 %
NEUTROPHILS # BLD AUTO: 7.6 X10E3/UL (ref 1.4–7)
NEUTROPHILS NFR BLD AUTO: 74 %
PLATELET # BLD AUTO: 300 X10E3/UL (ref 150–379)
POTASSIUM SERPL-SCNC: 4.4 MMOL/L (ref 3.5–5.2)
PROT SERPL-MCNC: 7.1 G/DL (ref 6–8.5)
RBC # BLD AUTO: 4.49 X10E6/UL (ref 3.77–5.28)
SODIUM SERPL-SCNC: 137 MMOL/L (ref 134–144)
WBC # BLD AUTO: 10.4 X10E3/UL (ref 3.4–10.8)

## 2018-12-05 NOTE — PROGRESS NOTES
No elevation in WBC, demi liver/pancreatic enzymes. This result does not reveal further cause of pain. Please be sure to complete ultrasound and referral to gastroenterology.

## 2018-12-13 ENCOUNTER — HOSPITAL ENCOUNTER (OUTPATIENT)
Dept: ULTRASOUND IMAGING | Age: 38
Discharge: HOME | End: 2018-12-13
Payer: COMMERCIAL

## 2018-12-13 DIAGNOSIS — Z90.49 HISTORY OF CHOLECYSTECTOMY: ICD-10-CM

## 2018-12-13 DIAGNOSIS — R10.11 COLICKY RUQ ABDOMINAL PAIN: ICD-10-CM

## 2018-12-13 PROCEDURE — 76705 ECHO EXAM OF ABDOMEN: CPT

## 2019-05-13 ENCOUNTER — OFFICE VISIT (OUTPATIENT)
Dept: URGENT CARE | Age: 39
End: 2019-05-13

## 2019-05-13 VITALS
BODY MASS INDEX: 31.15 KG/M2 | SYSTOLIC BLOOD PRESSURE: 133 MMHG | WEIGHT: 165 LBS | HEIGHT: 61 IN | HEART RATE: 101 BPM | TEMPERATURE: 99.5 F | DIASTOLIC BLOOD PRESSURE: 67 MMHG | RESPIRATION RATE: 16 BRPM | OXYGEN SATURATION: 98 %

## 2019-05-13 DIAGNOSIS — R50.9 FEVER CHILLS: Primary | ICD-10-CM

## 2019-05-13 DIAGNOSIS — J02.9 SORE THROAT: ICD-10-CM

## 2019-05-13 LAB
FLUAV+FLUBV AG NOSE QL IA.RAPID: NEGATIVE POS/NEG
FLUAV+FLUBV AG NOSE QL IA.RAPID: NEGATIVE POS/NEG
S PYO AG THROAT QL: NEGATIVE
VALID INTERNAL CONTROL?: YES
VALID INTERNAL CONTROL?: YES

## 2019-05-13 NOTE — LETTER
NOTIFICATION RETURN TO WORK / SCHOOL 
 
5/13/2019 9:43 AM 
 
Ms. Mckenzie Collins [address] [facility], located within [facility] To Whom It May Concern: 
 
Mckenzie Collins is currently under the care of [facility]. She will return to work/school on: 5/14 or 5/15/19. If there are questions or concerns please have the patient contact our office. Sincerely, Jelena Galo MD

## 2019-05-13 NOTE — PATIENT INSTRUCTIONS
Mucinex Fast max 2 tab 4 times/ day   Motrin 800 mg tid        Viral Infections: Care Instructions  Your Care Instructions    You don't feel well, but it's not clear what's causing it. You may have a viral infection. Viruses cause many illnesses, such as the common cold, influenza, fever, rashes, and the diarrhea, nausea, and vomiting that are often called \"stomach flu. \" You may wonder if antibiotic medicines could make you feel better. But antibiotics only treat infections caused by bacteria. They don't work on viruses. The good news is that viral infections usually aren't serious. Most will go away in a few days without medical treatment. In the meantime, there are a few things you can do to make yourself more comfortable. Follow-up care is a key part of your treatment and safety. Be sure to make and go to all appointments, and call your doctor if you are having problems. It's also a good idea to know your test results and keep a list of the medicines you take. How can you care for yourself at home? · Get plenty of rest if you feel tired. · Take an over-the-counter pain medicine if needed, such as acetaminophen (Tylenol), ibuprofen (Advil, Motrin), or naproxen (Aleve). Read and follow all instructions on the label. · Be careful when taking over-the-counter cold or flu medicines and Tylenol at the same time. Many of these medicines have acetaminophen, which is Tylenol. Read the labels to make sure that you are not taking more than the recommended dose. Too much acetaminophen (Tylenol) can be harmful. · Drink plenty of fluids, enough so that your urine is light yellow or clear like water. If you have kidney, heart, or liver disease and have to limit fluids, talk with your doctor before you increase the amount of fluids you drink. · Stay home from work, school, and other public places while you have a fever. When should you call for help? Call 911 anytime you think you may need emergency care.  For example, call if:    · You have severe trouble breathing.     · You passed out (lost consciousness).    Call your doctor now or seek immediate medical care if:    · You seem to be getting much sicker.     · You have a new or higher fever.     · You have blood in your stools.     · You have new belly pain, or your pain gets worse.     · You have a new rash.    Watch closely for changes in your health, and be sure to contact your doctor if:    · You start to get better and then get worse.     · You do not get better as expected. Where can you learn more? Go to http://masoud-luma.info/. Enter B011 in the search box to learn more about \"Viral Infections: Care Instructions. \"  Current as of: July 30, 2018  Content Version: 11.9  © 7924-3056 [employer]. Care instructions adapted under license by [employer] (which disclaims liability or warranty for this information). If you have questions about a medical condition or this instruction, always ask your healthcare professional. [address] any warranty or liability for your use of this information.

## 2019-07-31 ENCOUNTER — OFFICE VISIT (OUTPATIENT)
Dept: INTERNAL MEDICINE CLINIC | Age: 39
End: 2019-07-31

## 2019-07-31 VITALS
SYSTOLIC BLOOD PRESSURE: 126 MMHG | BODY MASS INDEX: 29.34 KG/M2 | WEIGHT: 155.4 LBS | HEIGHT: 61 IN | RESPIRATION RATE: 16 BRPM | HEART RATE: 74 BPM | DIASTOLIC BLOOD PRESSURE: 85 MMHG | OXYGEN SATURATION: 99 % | TEMPERATURE: 98 F

## 2019-07-31 DIAGNOSIS — L65.9 HAIR LOSS: ICD-10-CM

## 2019-07-31 DIAGNOSIS — R68.89 WORSENING FUNCTIONAL ENDURANCE: ICD-10-CM

## 2019-07-31 DIAGNOSIS — D35.2 PROLACTINOMA (HCC): Primary | ICD-10-CM

## 2019-07-31 DIAGNOSIS — Z83.49 FAMILY HISTORY OF THYROID DISEASE IN MOTHER: ICD-10-CM

## 2019-07-31 RX ORDER — OLOPATADINE HYDROCHLORIDE 2 MG/ML
SOLUTION/ DROPS OPHTHALMIC
Refills: 10 | COMMUNITY
Start: 2019-05-09

## 2019-07-31 RX ORDER — ETONOGESTREL AND ETHINYL ESTRADIOL .12; .015 MG/D; MG/D
INSERT, EXTENDED RELEASE VAGINAL
Refills: 1 | COMMUNITY
Start: 2019-07-18 | End: 2020-10-13

## 2019-07-31 NOTE — PROGRESS NOTES
RM 2    Patient reports not fasting today     Chief Complaint   Patient presents with    Fatigue     for a couple of weeks     Hair/Scalp Problem     patient reports feels she is losing a lot of hair and hair thinning within the past couple of weeks      1. Have you been to the ER, urgent care clinic since your last visit? Hospitalized since your last visit? No    2. Have you seen or consulted any other health care providers outside of the [address] [place] [address] since your last visit? Include any pap smears or colon screening.  No, OBGYN Dr. Field [place] Maintenance Due   Topic Date Due    Pneumococcal 0-64 years (1 of 1 - PPSV23) 06/07/1988    PAP AKA CERVICAL CYTOLOGY  06/01/2018       Learning Assessment 11/16/2017   PRIMARY LEARNER Patient   HIGHEST LEVEL OF EDUCATION - PRIMARY LEARNER  > 4 YEARS OF COLLEGE   BARRIERS PRIMARY LEARNER NONE   CO-LEARNER CAREGIVER No   PRIMARY LANGUAGE ENGLISH   LEARNER PREFERENCE PRIMARY LISTENING     -   ANSWERED BY patient   RELATIONSHIP SELF

## 2019-07-31 NOTE — PROGRESS NOTES
HPI Ronnald Mohs is a 42 y.o. female, she presents today for:    42year old woman. Generally healthy. Has a history of being very active, doing runs. Notes that she has been not having as much endurance with exercise. No new muscle pains. Normally a very high energy person. Looking large volumes of hair compared to normal.     History of prolactinoma. MRI brain in 2012. Followed with endo for a while. And retired from care after normalization without treatment. Has not noticed new breast pain. No drainage from nipple. No new dizziness with standing. Usually has low blood pressure. Has not had recent issues with anemia. PMH/PSH: reviewed and updated  Sochx:  reports that she has never smoked. She has never used smokeless tobacco. She reports that she drinks alcohol. She reports that she does not use drugs. Famhx: reviewed and updated     All: No Known Allergies  Med:   Current Outpatient Medications   Medication Sig    olopatadine (PATADAY) 0.2 % drop ophthalmic solution INSTILL 1 DROP INTO BOTH EYES EVERY DAY    NUVARING 0.12-0.015 mg/24 hr vaginal ring INSERT 1 RING VAGINALLY AS DIRECTED. REMOVE AFTER 3 WEEKS & WAIT 7 DAYS BEFORE INSERTING A NEW RING    azelastine (ASTELIN) 137 mcg (0.1 %) nasal spray SPRAY 1-2 SQUIRTS INTO EACH NOSTRIL EVERY 12 HOURS    multivitamin (ONE A DAY) tablet Take 1 Tab by mouth daily.  cetirizine (ZYRTEC) 10 mg tablet Take  by mouth as needed. Mostly in the Spring     albuterol (PROVENTIL HFA) 90 mcg/Actuation inhaler Take 1 Puff by inhalation.  JUNEL FE 1/20, 28, 1 mg-20 mcg (21)/75 mg (7) tab TAKE 1 TABLET BY MOUTH EVERY DAY     No current facility-administered medications for this visit. ROS    PE:  Blood pressure 126/85, pulse 74, temperature 98 °F (36.7 °C), temperature source Oral, resp. rate 16, height 5' 1\" (1.549 m), weight 155 lb 6.4 oz (70.5 kg), last menstrual period 07/14/2019, SpO2 99 %. Body mass index is 29.36 kg/m².   Physical Exam Constitutional: She is oriented to person, place, and time. She appears well-developed and well-nourished. No distress. HENT:   Head: Normocephalic. Mouth/Throat: Oropharynx is clear and moist.   Eyes: Conjunctivae and EOM are normal.   Neck: Neck supple. Cardiovascular: Normal rate, regular rhythm and normal heart sounds. Pulmonary/Chest: Effort normal and breath sounds normal.   Neurological: She is alert and oriented to person, place, and time. Skin: Skin is warm and dry. Nursing note and vitals reviewed. Labs: see addendum    A/P:  42 y.o. female    ICD-10-CM ICD-9-CM    1. [facility] D35.2 227.3 PROLACTIN   2. Hair loss L65.9 704.00 TSH 3RD GENERATION      T4, FREE      T3 TOTAL      THYROID ANTIBODY PANEL   3. Worsening functional endurance R68.89 780.99 TSH 3RD GENERATION      T4, FREE      T3 TOTAL      CBC WITH AUTOMATED DIFF      THYROID ANTIBODY PANEL   4. Family history of thyroid disease in mother Z80.51 V23.21 TSH 3RD GENERATION      T4, FREE      T3 TOTAL      CBC WITH AUTOMATED DIFF      THYROID ANTIBODY PANEL     Fatigue, functional endurance decreased and hair change. - screen for anemia, thyroid abnormality and prolactin elevation (given prior history of prolactinoma). - also discussed possible relation ship to change in ocp hormone and relation ship to recent increase in temperature. - encouarged ongoing good self care. - She was given AVS and expressed understanding with the diagnosis and plan as discussed.

## 2019-08-01 LAB
BASOPHILS # BLD AUTO: 0 X10E3/UL (ref 0–0.2)
BASOPHILS NFR BLD AUTO: 1 %
EOSINOPHIL # BLD AUTO: 0 X10E3/UL (ref 0–0.4)
EOSINOPHIL NFR BLD AUTO: 0 %
ERYTHROCYTE [DISTWIDTH] IN BLOOD BY AUTOMATED COUNT: 13.1 % (ref 12.3–15.4)
HCT VFR BLD AUTO: 38.5 % (ref 34–46.6)
HGB BLD-MCNC: 13 G/DL (ref 11.1–15.9)
IMM GRANULOCYTES # BLD AUTO: 0 X10E3/UL (ref 0–0.1)
IMM GRANULOCYTES NFR BLD AUTO: 1 %
LYMPHOCYTES # BLD AUTO: 1.3 X10E3/UL (ref 0.7–3.1)
LYMPHOCYTES NFR BLD AUTO: 22 %
MCH RBC QN AUTO: 28.6 PG (ref 26.6–33)
MCHC RBC AUTO-ENTMCNC: 33.8 G/DL (ref 31.5–35.7)
MCV RBC AUTO: 85 FL (ref 79–97)
MONOCYTES # BLD AUTO: 0.5 X10E3/UL (ref 0.1–0.9)
MONOCYTES NFR BLD AUTO: 8 %
NEUTROPHILS # BLD AUTO: 4.2 X10E3/UL (ref 1.4–7)
NEUTROPHILS NFR BLD AUTO: 68 %
PLATELET # BLD AUTO: 291 X10E3/UL (ref 150–450)
PROLACTIN SERPL-MCNC: 19.2 NG/ML (ref 4.8–23.3)
RBC # BLD AUTO: 4.55 X10E6/UL (ref 3.77–5.28)
T3 SERPL-MCNC: 152 NG/DL (ref 71–180)
T4 FREE SERPL-MCNC: 1.45 NG/DL (ref 0.82–1.77)
THYROGLOB AB SERPL-ACNC: <1 IU/ML (ref 0–0.9)
THYROPEROXIDASE AB SERPL-ACNC: 10 IU/ML (ref 0–34)
TSH SERPL DL<=0.005 MIU/L-ACNC: 1.07 UIU/ML (ref 0.45–4.5)
WBC # BLD AUTO: 6.1 X10E3/UL (ref 3.4–10.8)

## 2019-08-01 NOTE — PROGRESS NOTES
Labs all look very good. Normal thyroid hormone levels. Prolactin also in normal range. The CBC shows good blood counts. There is a strong possibility that the change in hair pattern may be related to change in birth control hormones. I would suggest we give it 1-2 months to settle out before further investigation.

## 2020-10-13 ENCOUNTER — OFFICE VISIT (OUTPATIENT)
Dept: INTERNAL MEDICINE CLINIC | Age: 40
End: 2020-10-13
Payer: COMMERCIAL

## 2020-10-13 VITALS
BODY MASS INDEX: 30.62 KG/M2 | HEIGHT: 61 IN | SYSTOLIC BLOOD PRESSURE: 134 MMHG | TEMPERATURE: 98 F | WEIGHT: 162.2 LBS | OXYGEN SATURATION: 98 % | RESPIRATION RATE: 17 BRPM | DIASTOLIC BLOOD PRESSURE: 79 MMHG | HEART RATE: 68 BPM

## 2020-10-13 DIAGNOSIS — E22.9 PITUITARY MICROADENOMA WITH HYPERPROLACTINEMIA (HCC): ICD-10-CM

## 2020-10-13 DIAGNOSIS — E22.1 HYPERPROLACTINEMIA (HCC): ICD-10-CM

## 2020-10-13 DIAGNOSIS — R53.82 CHRONIC FATIGUE: ICD-10-CM

## 2020-10-13 DIAGNOSIS — L65.9 HAIR LOSS: ICD-10-CM

## 2020-10-13 DIAGNOSIS — J45.990 EXERCISE-INDUCED ASTHMA: ICD-10-CM

## 2020-10-13 DIAGNOSIS — D35.2 PITUITARY MICROADENOMA WITH HYPERPROLACTINEMIA (HCC): ICD-10-CM

## 2020-10-13 DIAGNOSIS — Z00.00 WELL ADULT EXAM: Primary | ICD-10-CM

## 2020-10-13 LAB
ERYTHROCYTE [DISTWIDTH] IN BLOOD BY AUTOMATED COUNT: 13.2 % (ref 11.5–14.5)
FERRITIN SERPL-MCNC: 10 NG/ML (ref 8–252)
HCT VFR BLD AUTO: 41.1 % (ref 35–47)
HGB BLD-MCNC: 13.5 G/DL (ref 11.5–16)
MCH RBC QN AUTO: 28.8 PG (ref 26–34)
MCHC RBC AUTO-ENTMCNC: 32.8 G/DL (ref 30–36.5)
MCV RBC AUTO: 87.8 FL (ref 80–99)
NRBC # BLD: 0 K/UL (ref 0–0.01)
NRBC BLD-RTO: 0 PER 100 WBC
PLATELET # BLD AUTO: 189 K/UL (ref 150–400)
PMV BLD AUTO: 11.4 FL (ref 8.9–12.9)
PROLACTIN SERPL-MCNC: 5.2 NG/ML
RBC # BLD AUTO: 4.68 M/UL (ref 3.8–5.2)
T4 FREE SERPL-MCNC: 1.2 NG/DL (ref 0.8–1.5)
TSH SERPL DL<=0.05 MIU/L-ACNC: 0.94 UIU/ML (ref 0.36–3.74)
WBC # BLD AUTO: 6.9 K/UL (ref 3.6–11)

## 2020-10-13 PROCEDURE — 99395 PREV VISIT EST AGE 18-39: CPT | Performed by: INTERNAL MEDICINE

## 2020-10-13 RX ORDER — NORGESTIMATE AND ETHINYL ESTRADIOL 0.25-0.035
KIT ORAL
COMMUNITY
Start: 2020-07-07

## 2020-10-13 RX ORDER — ALBUTEROL SULFATE 90 UG/1
1 AEROSOL, METERED RESPIRATORY (INHALATION)
Qty: 1 INHALER | Refills: 12 | Status: SHIPPED | OUTPATIENT
Start: 2020-10-13

## 2020-10-13 NOTE — PATIENT INSTRUCTIONS
Fatigue: Care Instructions Your Care Instructions Fatigue is a feeling of tiredness, exhaustion, or lack of energy. You may feel fatigue because of too much or not enough activity. It can also come from stress, lack of sleep, boredom, and poor diet. Many medical problems, such as viral infections, can cause fatigue. Emotional problems, especially depression, are often the cause of fatigue. Fatigue is most often a symptom of another problem. Treatment for fatigue depends on the cause. For example, if you have fatigue because you have a certain health problem, treating this problem also treats your fatigue. If depression or anxiety is the cause, treatment may help. Follow-up care is a key part of your treatment and safety. Be sure to make and go to all appointments, and call your doctor if you are having problems. It's also a good idea to know your test results and keep a list of the medicines you take. How can you care for yourself at home? · Get regular exercise. But don't overdo it. Go back and forth between rest and exercise. · Get plenty of rest. 
· Eat a healthy diet. Do not skip meals, especially breakfast. 
· Reduce your use of caffeine, tobacco, and alcohol. Caffeine is most often found in coffee, tea, cola drinks, and chocolate. · Limit medicines that can cause fatigue. This includes tranquilizers and cold and allergy medicines. When should you call for help? Watch closely for changes in your health, and be sure to contact your doctor if: 
  · You have new symptoms such as fever or a rash.  
  · Your fatigue gets worse.  
  · You have been feeling down, depressed, or hopeless. Or you may have lost interest in things that you usually enjoy.  
  · You are not getting better as expected. Where can you learn more? Go to http://www.gray.com/ Enter U234 in the search box to learn more about \"Fatigue: Care Instructions. \" 
 Current as of: June 26, 2019               Content Version: 12.6 © 5361-4837 [employer]. Care instructions adapted under license by [employer] (which disclaims liability or warranty for this information). If you have questions about a medical condition or this instruction, always ask your healthcare professional. [address] any warranty or liability for your use of this information.

## 2020-10-13 NOTE — PROGRESS NOTES
RM 2    Chief Complaint   Patient presents with    Complete Physical    Fatigue    Weight Management     reports eating well and exercising still cant reduce weight     Hair/Scalp Problem     reports hair falling out      1. Have you been to the ER, urgent care clinic since your last visit? Hospitalized since your last visit? No    2. Have you seen or consulted any other health care providers outside of the [address] [place] [address] since your last visit? Include any pap smears or colon screening. [facility] - Had pap smear     Health Maintenance Due   Topic Date Due    Pneumococcal 0-64 years (1 of 1 - PPSV23) 06/07/1988    PAP AKA CERVICAL CYTOLOGY  12/14/2019    Flu Vaccine (1) 09/01/2020       Learning Assessment 11/16/2017   PRIMARY LEARNER Patient   HIGHEST LEVEL OF EDUCATION - PRIMARY LEARNER  > 4 YEARS OF COLLEGE   BARRIERS PRIMARY LEARNER NONE   CO-LEARNER CAREGIVER No   PRIMARY LANGUAGE ENGLISH   LEARNER PREFERENCE PRIMARY LISTENING     -   ANSWERED BY patient   RELATIONSHIP SELF     Patient reports had flu vaccine with her job 2 weeks ago already     Patient declines vaccines for today     AVS  education, follow up, and recommendations provided and addressed with patient.  services used to advise patient no .

## 2020-10-13 NOTE — PROGRESS NOTES
HPI   Stacey Saunders is a 47 y.o. female, she presents today for:     Sleeps until she wake up and not drowsy during day. However daytime fatigue. Changed her hormonal birth control and had some brief change in hair. Has always had hair on face, treated with laser. Not new. No family history of hair thinning. Notes that her personal response to stress is pretty good. History of prolactinoma, self resolved. monitored with endocrinology around 2012. Notes that the overall thickness of hair is decreased compared to prior. PMH/PSH: reviewed and updated  Sochx:  reports that she has never smoked. She has never used smokeless tobacco. She reports current alcohol use. She reports that she does not use drugs. Famhx: reviewed and updated     All: No Known Allergies  Med:   Current Outpatient Medications   Medication Sig    Sprintec, 28, 0.25-35 mg-mcg tab TAKE 1 TABLET BY MOUTH EVERY DAY    olopatadine (PATADAY) 0.2 % drop ophthalmic solution INSTILL 1 DROP INTO BOTH EYES EVERY DAY    azelastine (ASTELIN) 137 mcg (0.1 %) nasal spray SPRAY 1-2 SQUIRTS INTO EACH NOSTRIL EVERY 12 HOURS    multivitamin (ONE A DAY) tablet Take 1 Tab by mouth daily.  cetirizine (ZYRTEC) 10 mg tablet Take  by mouth as needed. Mostly in the Spring     albuterol (PROVENTIL HFA) 90 mcg/Actuation inhaler Take 1 Puff by inhalation.  NUVARING 0.12-0.015 mg/24 hr vaginal ring INSERT 1 RING VAGINALLY AS DIRECTED. REMOVE AFTER 3 WEEKS & WAIT 7 DAYS BEFORE INSERTING A NEW RING     No current facility-administered medications for this visit. Review of Systems   Constitutional: Negative for chills, fever and malaise/fatigue. Respiratory: Negative for shortness of breath. Cardiovascular: Negative for chest pain. PE:  Blood pressure 134/79, pulse 68, temperature 98 °F (36.7 °C), temperature source Oral, resp.  rate 17, height 5' 1\" (1.549 m), weight 162 lb 3.2 oz (73.6 kg), last menstrual period 09/14/2020, SpO2 98 %.  Body mass index is 30.65 kg/m². Physical Exam  Vitals signs and nursing note reviewed. Constitutional:       General: She is not in acute distress. Appearance: Normal appearance. She is normal weight. HENT:      Head: Normocephalic and atraumatic. Right Ear: Tympanic membrane normal.      Left Ear: Tympanic membrane normal.      Nose: Nose normal.      Mouth/Throat:      Mouth: Mucous membranes are moist.   Eyes:      Extraocular Movements: Extraocular movements intact. Conjunctiva/sclera: Conjunctivae normal.      Pupils: Pupils are equal, round, and reactive to light. Neck:      Musculoskeletal: Normal range of motion and neck supple. Cardiovascular:      Rate and Rhythm: Normal rate and regular rhythm. Heart sounds: Normal heart sounds. Pulmonary:      Effort: Pulmonary effort is normal.      Breath sounds: Normal breath sounds. Musculoskeletal: Normal range of motion. Skin:     General: Skin is warm and dry. Neurological:      Mental Status: She is alert and oriented to person, place, and time. Labs:   No results found for any visits on 10/13/20. A/P:  47 y.o. female    ICD-10-CM ICD-9-CM    1. Well adult exam  Z00.00 V70.0    2. Pituitary microadenoma with hyperprolactinemia (HCC)  D35.2 227.3     E22.9 253.1    3. Exercise-induced asthma  J45.990 493.81 albuterol (Proventil HFA) 90 mcg/actuation inhaler   4. Hair loss  L65.9 704.00 TSH 3RD GENERATION      T4, FREE      FERRITIN      CBC W/O DIFF      CBC W/O DIFF      FERRITIN      T4, FREE      TSH 3RD GENERATION   5. Hyperprolactinemia (HCC)  E22.1 253.1 PROLACTIN      PROLACTIN   6. Chronic fatigue  R53.82 780.79 TSH 3RD GENERATION      T4, FREE      FERRITIN      CBC W/O DIFF      CBC W/O DIFF      FERRITIN      T4, FREE      TSH 3RD GENERATION     Well woman (non-gyn) exam: history and exam revealed issues as noted below.    Cancer screening:   - pap up to date   - no family history of breast nor colon cancer. Never smoker    Vaccine status: offerred ppsv, declines (indicated for exercise induced asthma)  Cardiovascular risk: BP well controlled. Bone health: daily vit D supplement. Diet and Exercise: not drinking sugary beverages. Hair loss and chronic fatigue likely stress related. However will evaluate for iron deficiency and thyroid disorder. Continue good nutrition and self care. History of prolactinoma: Pl level requested for monitoring.       - She was given AVS and expressed understanding with the diagnosis and plan as discussed. No future appointments. Follow-up and Dispositions    · Return in about 1 year (around 10/13/2021) for well visit.

## 2023-05-12 RX ORDER — ALBUTEROL SULFATE 90 UG/1
1 AEROSOL, METERED RESPIRATORY (INHALATION) EVERY 4 HOURS PRN
COMMUNITY
Start: 2020-10-13

## 2023-05-12 RX ORDER — NORGESTIMATE AND ETHINYL ESTRADIOL 0.25-0.035
1 KIT ORAL DAILY
COMMUNITY
Start: 2020-07-07

## 2023-05-12 RX ORDER — OLOPATADINE HYDROCHLORIDE 2 MG/ML
SOLUTION/ DROPS OPHTHALMIC
COMMUNITY
Start: 2019-05-09

## 2023-05-12 RX ORDER — CETIRIZINE HYDROCHLORIDE 10 MG/1
TABLET ORAL PRN
COMMUNITY

## 2023-11-01 NOTE — PROGRESS NOTES
Fever    The history is provided by the patient. This is a new problem. The current episode started yesterday. The problem has not changed since onset. The maximum temperature noted was 101 - 101.9 F. The temperature was taken using a tympanic thermometer. Associated symptoms include congestion, headaches, sore throat and muscle aches. Pertinent negatives include no chest pain, no diarrhea, no cough, no shortness of breath, no neck pain, no rash and no urinary symptoms. She has tried nothing for the symptoms.         Past Medical History:   Diagnosis Date    Acute sinusitis, unspecified     Allergic rhinitis 2011    Anxiety as acute reaction to stress 2011    Anxiety state, unspecified     AR (allergic rhinitis)     Asthma     Cervical dysplasia     Cough variant asthma     Exercise-induced asthma 2014    External hemorrhoids 2011    H/O Right Radial head fracture, 2011    History of anemia 2011    History of childhood asthma 2011    Hyperprolactinemia ([address] 2012    Pituitary microadenoma with hyperprolactinemia (HCC)     Routine gynecological examination     TMJ (temporomandibular joint syndrome) due to Bruxism & Clenching 2011    Unspecified contraceptive management         Past Surgical History:   Procedure Laterality Date    CONTRACEPT IUD  -    CRYOCAUTERY OF CERVIX  2007    HX CHOLECYSTECTOMY      HX LEEP PROCEDURE  2004    [facility] LEEP PROCEDURE  2004    [facility]    LAP,CHOLECYSTECTOMY  3/2006    gallstones & dyskinesia at 14 weeks pregnant; Dr Sung Arauz         Family History   Problem Relation Age of Onset    Hypertension Father     Cancer Maternal Grandmother         Leukemia,  in her mid 66's    Cancer Maternal Grandfather         Prostate cancer    Asthma Sister     Psychiatric Disorder Son         ADD, OCD        Social History     Socioeconomic History    Marital status:      Spouse name: Not on file  Number of children: 1    Years of education: Not on file    Highest education level: Not on file   Occupational History    Occupation: ,    Social Needs    Financial resource strain: Not on file    Food insecurity:     Worry: Not on file     Inability: Not on file   [employer] needs:     Medical: Not on file     Non-medical: Not on file   Tobacco Use    Smoking status: Never Smoker    Smokeless tobacco: Never Used   Substance and Sexual Activity    Alcohol use: No    Drug use: No    Sexual activity: Yes     Partners: Male     Birth control/protection: None   Lifestyle    Physical activity:     Days per week: Not on file     Minutes per session: Not on file    Stress: Not on file   Relationships    Social connections:     Talks on phone: Not on file     Gets together: Not on file     Attends Advent service: Not on file     Active member of club or organization: Not on file     Attends meetings of clubs or organizations: Not on file     Relationship status: Not on file    Intimate partner violence:     Fear of current or ex partner: Not on file     Emotionally abused: Not on file     Physically abused: Not on file     Forced sexual activity: Not on file   Other Topics Concern     Service Not Asked    Blood Transfusions Not Asked    Caffeine Concern No     Comment: 1 cup of coffee qam    Occupational Exposure Not Asked    Hobby Hazards Not Asked    Sleep Concern Not Asked    Stress Concern Not Asked    Weight Concern Yes     Comment: has been creeping up over the past yr (up ~ 10 lbs)    Special Diet No     Comment: just tries to follow sensible diet    Back Care Not Asked    Exercise Yes     Comment: 2 x a week, kick boxing class x 1-2 hours    Bike Helmet Not Asked    Seat Belt Not Asked    Self-Exams Not Asked   Social History Narrative    ** Merged History Encounter **         [place] and [address] [place] [address] descent;  travels a lot for work; he Patient is not pregnant (male or female) was in [facility] in 2009 and some of 2010                ALLERGIES: Patient has no known allergies. Review of Systems   Constitutional: Positive for chills and fever. HENT: Positive for congestion and sore throat. Respiratory: Negative for cough and shortness of breath. Cardiovascular: Negative for chest pain. Gastrointestinal: Negative for diarrhea. Musculoskeletal: Negative for neck pain. Skin: Negative for rash. Neurological: Positive for headaches. All other systems reviewed and are negative. Vitals:    05/13/19 0846   BP: 133/67   Pulse: (!) 101   Resp: 16   Temp: 99.5 °F (37.5 °C)   SpO2: 98%   Weight: 165 lb (74.8 kg)   Height: 5' 1\" (1.549 m)       Physical Exam   Constitutional: No distress. HENT:   Right Ear: Tympanic membrane and ear canal normal.   Left Ear: Tympanic membrane and ear canal normal.   Nose: Nose normal.   Mouth/Throat: No oropharyngeal exudate, posterior oropharyngeal edema or posterior oropharyngeal erythema. Eyes: Conjunctivae are normal. Right eye exhibits no discharge. Left eye exhibits no discharge. Neck: Neck supple. Pulmonary/Chest: Effort normal and breath sounds normal. No respiratory distress. She has no wheezes. She has no rales. Lymphadenopathy:     She has no cervical adenopathy. Skin: No rash noted. Nursing note and vitals reviewed. MDM    Procedures    ICD-10-CM ICD-9-CM    1. Fever chills R50.9 780.60 AMB POC RAPID STREP A      AMB POC ALESSANDRA INFLUENZA A/B TEST   2. Sore throat J02.9 462 AMB POC RAPID STREP A     No orders of the defined types were placed in this encounter.     Results for orders placed or performed in visit on 05/13/19   AMB POC RAPID STREP A   Result Value Ref Range    VALID INTERNAL CONTROL POC Yes     Group A Strep Ag Negative Negative   AMB POC ALESSANDRA INFLUENZA A/B TEST   Result Value Ref Range    VALID INTERNAL CONTROL POC Yes     Influenza A Ag POC Negative Negative Pos/Neg    Influenza B Ag POC Negative Negative Pos/Neg     The patients condition was discussed with the patient and they understand. The patient is to follow up with primary care doctor. If signs and symptoms become worse the pt is to go to the ER. The patient is to take medications as prescribed.

## 2023-11-07 ENCOUNTER — HOSPITAL ENCOUNTER (OUTPATIENT)
Facility: HOSPITAL | Age: 43
Discharge: HOME | End: 2023-11-10
Payer: COMMERCIAL

## 2023-11-07 DIAGNOSIS — R10.31 RLQ ABDOMINAL PAIN: ICD-10-CM

## 2023-11-07 DIAGNOSIS — R19.4 ALTERED BOWEL HABITS: ICD-10-CM

## 2023-11-07 PROCEDURE — 6360000004 HC RX CONTRAST MEDICATION: Performed by: RADIOLOGY

## 2023-11-07 PROCEDURE — 74177 CT ABD & PELVIS W/CONTRAST: CPT

## 2023-11-07 RX ADMIN — IOPAMIDOL 100 ML: 755 INJECTION, SOLUTION INTRAVENOUS at 17:01

## 2025-05-08 NOTE — PATIENT INSTRUCTIONS
Hashimoto's Thyroiditis: Care Instructions Your Care Instructions Hashimoto's thyroiditis is a problem with the thyroid gland. The thyroid gland, which is in your neck, controls the way your body uses energy. Sometimes the disease causes the gland to make too much thyroid hormone (thyrotoxicosis). This can make you feel nervous, lose weight, and have many loose bowel movements. You may also have a fast heartbeat. But as the disease progresses, the gland usually does not make enough thyroid hormone. This can cause you to feel tired and have dry skin and thinning hair. Most people with Hashimoto's are diagnosed when they have these symptoms. You may need to take medicine if you have symptoms or if your thyroid hormone level is not normal. Most people with Hashimoto's thyroiditis need to take medicine for the rest of their lives. Follow-up care is a key part of your treatment and safety. Be sure to make and go to all appointments, and call your doctor if you are having problems. It's also a good idea to know your test results and keep a list of the medicines you take. How can you care for yourself at home? · Take your medicines exactly as prescribed. Call your doctor if you think you are having a problem with your medicine. You will get more details on the specific medicines your doctor prescribes. When should you call for help? Call 911 anytime you think you may need emergency care. For example, call if: 
  · You passed out (lost consciousness).  
  · You have severe trouble breathing.  
  · You have a very slow heartbeat (less than 60 beats a minute).  
  · You have a low body temperature (95°F or below).  
 Watch closely for changes in your health, and be sure to contact your doctor if: 
  · You gain weight even though you are eating normally or less than usual.  
  · You feel extremely weak or tired.  
  · You have new changes in your skin, nails, or hair, or the changes get worse.   · You notice that your thyroid gland has grown or changed in size.  
  · You have constipation that is new or that gets worse.  
  · You cannot stand cold temperatures.  
  · You have heavy or irregular menstrual periods.  
  · You have other new symptoms. Where can you learn more? Go to http://masoud-luma.info/. Enter K454 in the search box to learn more about \"Hashimoto's Thyroiditis: Care Instructions. \" Current as of: November 6, 2018 Content Version: 12.1 © 5492-8936 [employer]. Care instructions adapted under license by [employer] (which disclaims liability or warranty for this information). If you have questions about a medical condition or this instruction, always ask your healthcare professional. [address] any warranty or liability for your use of this information. 75